# Patient Record
Sex: MALE | Race: WHITE | NOT HISPANIC OR LATINO | Employment: FULL TIME | URBAN - METROPOLITAN AREA
[De-identification: names, ages, dates, MRNs, and addresses within clinical notes are randomized per-mention and may not be internally consistent; named-entity substitution may affect disease eponyms.]

---

## 2018-02-16 ENCOUNTER — HOSPITAL ENCOUNTER (OUTPATIENT)
Dept: GENERAL RADIOLOGY | Facility: HOSPITAL | Age: 68
Discharge: HOME | End: 2018-02-16
Attending: FAMILY MEDICINE | Admitting: FAMILY MEDICINE

## 2018-02-16 ENCOUNTER — OFFICE VISIT (OUTPATIENT)
Dept: FAMILY MEDICINE CLINIC | Facility: CLINIC | Age: 68
End: 2018-02-16

## 2018-02-16 VITALS
BODY MASS INDEX: 25.69 KG/M2 | HEART RATE: 66 BPM | DIASTOLIC BLOOD PRESSURE: 98 MMHG | TEMPERATURE: 98.1 F | HEIGHT: 67 IN | SYSTOLIC BLOOD PRESSURE: 168 MMHG | WEIGHT: 163.7 LBS | OXYGEN SATURATION: 97 %

## 2018-02-16 DIAGNOSIS — M79.672 FOOT PAIN, LEFT: Primary | ICD-10-CM

## 2018-02-16 DIAGNOSIS — Z00.00 ROUTINE ADULT HEALTH MAINTENANCE: ICD-10-CM

## 2018-02-16 DIAGNOSIS — M79.672 FOOT PAIN, LEFT: ICD-10-CM

## 2018-02-16 DIAGNOSIS — Z12.5 SCREENING FOR PROSTATE CANCER: ICD-10-CM

## 2018-02-16 PROCEDURE — 99203 OFFICE O/P NEW LOW 30 MIN: CPT | Performed by: FAMILY MEDICINE

## 2018-02-16 PROCEDURE — 73630 X-RAY EXAM OF FOOT: CPT

## 2018-02-16 NOTE — PROGRESS NOTES
Subjective   Michael Whiting is a 67 y.o. male who is here for   Chief Complaint   Patient presents with   • New patient   • Foot Pain     left x 1 year    .     History of Present Illness   Nice new pt  No medical care in 5 yr  [place]ives local  Still working  Hurt left foot 1.5 yr ago, still hurts  Quit smoking  2 yr ago  Would like labs  Worried about bp    The following portions of the patient's history were reviewed and updated as appropriate: allergies, current medications, past family history, past medical history, past social history, past surgical history and problem list.    Review of Systems   Constitutional: Negative for unexpected weight change.   HENT: Negative.    Respiratory: Positive for cough.    Gastrointestinal: Negative.    Genitourinary: Negative.        Objective   Physical Exam   Constitutional: He appears well-developed and well-nourished.   Cardiovascular: Normal rate.    Pulmonary/Chest: Effort normal.   Abdominal: Soft.   Musculoskeletal:        Left foot: There is bony tenderness. There is no deformity.        Neurological: He is alert.   Psychiatric: He has a normal mood and affect.   Vitals reviewed.      Assessment/Plan   Michael was seen today for new patient and foot pain.    Diagnoses and all orders for this visit:    Foot pain, left  -     XR Foot 3+ View Left; Future    Routine adult health maintenance  -     Lipid Panel; Future  -     CBC (No Diff); Future  -     Comprehensive Metabolic Panel; Future  -     TSH; Future  -     Urinalysis With / Microscopic If Indicated - Urine, Clean Catch; Future    Screening for prostate cancer  -     PSA Screen; Future      There are no Patient Instructions on file for this visit.    There are no discontinued medications.     Return in about 1 month (around 3/16/2018) for Medicare Wellness visit, blood pressure.    Dr. Zheng Silva  [facility] [place], [place]

## 2018-02-21 ENCOUNTER — RESULTS ENCOUNTER (OUTPATIENT)
Dept: FAMILY MEDICINE CLINIC | Facility: CLINIC | Age: 68
End: 2018-02-21

## 2018-02-21 DIAGNOSIS — Z00.00 ROUTINE ADULT HEALTH MAINTENANCE: ICD-10-CM

## 2018-02-21 DIAGNOSIS — Z12.5 SCREENING FOR PROSTATE CANCER: ICD-10-CM

## 2018-03-09 LAB
ALBUMIN SERPL-MCNC: 4.1 G/DL (ref 3.5–5.2)
ALBUMIN/GLOB SERPL: 1.7 G/DL
ALP SERPL-CCNC: 93 U/L (ref 40–129)
ALT SERPL-CCNC: 19 U/L (ref 5–41)
APPEARANCE UR: CLEAR
AST SERPL-CCNC: 17 U/L (ref 5–40)
BACTERIA #/AREA URNS HPF: ABNORMAL /HPF
BILIRUB SERPL-MCNC: 0.5 MG/DL (ref 0.2–1.2)
BILIRUB UR QL STRIP: NEGATIVE
BUN SERPL-MCNC: 16 MG/DL (ref 8–23)
BUN/CREAT SERPL: 14.8 (ref 7–25)
CALCIUM SERPL-MCNC: 9.1 MG/DL (ref 8.8–10.5)
CHLORIDE SERPL-SCNC: 103 MMOL/L (ref 98–107)
CHOLEST SERPL-MCNC: 156 MG/DL (ref 0–200)
CO2 SERPL-SCNC: 28.3 MMOL/L (ref 22–29)
COLOR UR: YELLOW
CREAT SERPL-MCNC: 1.08 MG/DL (ref 0.76–1.27)
EPI CELLS #/AREA URNS HPF: ABNORMAL /HPF
ERYTHROCYTE [DISTWIDTH] IN BLOOD BY AUTOMATED COUNT: 12.7 % (ref 11.5–14.5)
GFR SERPLBLD CREATININE-BSD FMLA CKD-EPI: 68 ML/MIN/1.73
GFR SERPLBLD CREATININE-BSD FMLA CKD-EPI: 82 ML/MIN/1.73
GLOBULIN SER CALC-MCNC: 2.4 GM/DL
GLUCOSE SERPL-MCNC: 99 MG/DL (ref 65–99)
GLUCOSE UR QL: NEGATIVE
HCT VFR BLD AUTO: 46.2 % (ref 42–52)
HDLC SERPL-MCNC: 50 MG/DL (ref 40–60)
HGB BLD-MCNC: 15.5 G/DL (ref 14–18)
HGB UR QL STRIP: ABNORMAL
KETONES UR QL STRIP: NEGATIVE
LDLC SERPL CALC-MCNC: 93 MG/DL (ref 0–100)
LEUKOCYTE ESTERASE UR QL STRIP: NEGATIVE
MCH RBC QN AUTO: 32.5 PG (ref 27–31)
MCHC RBC AUTO-ENTMCNC: 33.5 G/DL (ref 31–37)
MCV RBC AUTO: 96.9 FL (ref 80–94)
NITRITE UR QL STRIP: NEGATIVE
PH UR STRIP: 6 [PH] (ref 4.5–8)
PLATELET # BLD AUTO: 271 10*3/MM3 (ref 140–500)
POTASSIUM SERPL-SCNC: 4.3 MMOL/L (ref 3.5–5.2)
PROT SERPL-MCNC: 6.5 G/DL (ref 6–8.5)
PROT UR QL STRIP: ABNORMAL
PSA SERPL-MCNC: 42.2 NG/ML (ref 0–4)
RBC # BLD AUTO: 4.77 10*6/MM3 (ref 4.7–6.1)
RBC #/AREA URNS HPF: ABNORMAL /HPF
SODIUM SERPL-SCNC: 140 MMOL/L (ref 136–145)
SP GR UR: 1.03 (ref 1–1.03)
TRIGL SERPL-MCNC: 66 MG/DL (ref 0–150)
TSH SERPL DL<=0.005 MIU/L-ACNC: 0.86 MIU/ML (ref 0.27–4.2)
UROBILINOGEN UR STRIP-MCNC: ABNORMAL MG/DL
VLDLC SERPL CALC-MCNC: 13.2 MG/DL (ref 8–32)
WBC # BLD AUTO: 6.97 10*3/MM3 (ref 4.8–10.8)
WBC #/AREA URNS HPF: ABNORMAL /HPF

## 2018-03-16 ENCOUNTER — OFFICE VISIT (OUTPATIENT)
Dept: FAMILY MEDICINE CLINIC | Facility: CLINIC | Age: 68
End: 2018-03-16

## 2018-03-16 VITALS
OXYGEN SATURATION: 97 % | HEIGHT: 67 IN | DIASTOLIC BLOOD PRESSURE: 98 MMHG | WEIGHT: 165 LBS | SYSTOLIC BLOOD PRESSURE: 160 MMHG | BODY MASS INDEX: 25.9 KG/M2 | HEART RATE: 70 BPM | TEMPERATURE: 97.9 F

## 2018-03-16 DIAGNOSIS — R97.20 ELEVATED PSA, GREATER THAN OR EQUAL TO 20 NG/ML: Primary | ICD-10-CM

## 2018-03-16 PROCEDURE — 99213 OFFICE O/P EST LOW 20 MIN: CPT | Performed by: FAMILY MEDICINE

## 2018-03-16 NOTE — PROGRESS NOTES
Subjective   Michael Whiting is a 67 y.o. male who is here for   Chief Complaint   Patient presents with   • Follow-up   • Elevated PSA   .     History of Present Illness   Benign Prostatic Hypertrophy: Patient complains of lower urinary tract symptoms. Patient reports moderate symptoms of BPH. Onset of symptoms was several years ago and was gradual in onset. His AUA Symptom Score is, x/0 manifested as obstructive symptoms including incomplete emptying, intermittency, weak stream, straining, postvoid dribbling. He has no personal history and no family history of prostate cancer.  He reports straining.  He denies nocturia two times a night.  He reports a  history of no complicating symptoms.  He denies flank pain, gross hematuria, kidney stones and recurrent UTI.    Lab Results   Component Value Date    PSA 42.200 (H) 03/09/2018           The following portions of the patient's history were reviewed and updated as appropriate: allergies, current medications, past family history, past medical history, past social history, past surgical history and problem list.    Review of Systems    Objective   Physical Exam   Genitourinary: Prostate is enlarged. Prostate is not tender.   Genitourinary Comments: Right prostate is enlarged  Stiff  And a nodule is palpated on right  Worrisome for cancer   Nursing note and vitals reviewed.      Assessment/Plan   Michael was seen today for follow-up and elevated psa.    Diagnoses and all orders for this visit:    Elevated PSA, greater than or equal to 20 ng/ml  -     Ambulatory Referral to Urology      There are no Patient Instructions on file for this visit.    There are no discontinued medications.     Return if symptoms worsen or fail to improve.    Dr. Zheng Silva  [place]

## 2018-05-31 ENCOUNTER — TRANSCRIBE ORDERS (OUTPATIENT)
Dept: ADMINISTRATIVE | Facility: HOSPITAL | Age: 68
End: 2018-05-31

## 2018-05-31 DIAGNOSIS — R97.20 ELEVATED PSA: Primary | ICD-10-CM

## 2018-06-07 ENCOUNTER — HOSPITAL ENCOUNTER (OUTPATIENT)
Dept: NUCLEAR MEDICINE | Facility: HOSPITAL | Age: 68
Discharge: HOME | End: 2018-06-07
Attending: UROLOGY

## 2018-06-07 ENCOUNTER — HOSPITAL ENCOUNTER (OUTPATIENT)
Dept: CT IMAGING | Facility: HOSPITAL | Age: 68
Discharge: HOME | End: 2018-06-07
Attending: UROLOGY | Admitting: UROLOGY

## 2018-06-07 DIAGNOSIS — R97.20 ELEVATED PSA: ICD-10-CM

## 2018-06-07 PROCEDURE — 0 TECHNETIUM MEDRONATE KIT: Performed by: UROLOGY

## 2018-06-07 PROCEDURE — 74176 CT ABD & PELVIS W/O CONTRAST: CPT

## 2018-06-07 PROCEDURE — A9503 TC99M MEDRONATE: HCPCS | Performed by: UROLOGY

## 2018-06-07 PROCEDURE — 78306 BONE IMAGING WHOLE BODY: CPT

## 2018-06-07 RX ORDER — TC 99M MEDRONATE 20 MG/10ML
20 INJECTION, POWDER, LYOPHILIZED, FOR SOLUTION INTRAVENOUS
Status: COMPLETED | OUTPATIENT
Start: 2018-06-07 | End: 2018-06-07

## 2018-06-07 RX ADMIN — Medication 20 MILLICURIE: at 12:20

## 2019-05-29 ENCOUNTER — OFFICE VISIT (OUTPATIENT)
Dept: FAMILY MEDICINE CLINIC | Facility: CLINIC | Age: 69
End: 2019-05-29

## 2019-05-29 VITALS
WEIGHT: 167 LBS | OXYGEN SATURATION: 97 % | TEMPERATURE: 98.1 F | HEIGHT: 68 IN | BODY MASS INDEX: 25.31 KG/M2 | DIASTOLIC BLOOD PRESSURE: 82 MMHG | HEART RATE: 70 BPM | SYSTOLIC BLOOD PRESSURE: 130 MMHG

## 2019-05-29 DIAGNOSIS — J40 BRONCHITIS: Primary | ICD-10-CM

## 2019-05-29 PROBLEM — Z12.5 SCREENING FOR PROSTATE CANCER: Status: RESOLVED | Noted: 2018-02-16 | Resolved: 2019-05-29

## 2019-05-29 PROBLEM — Z85.46 HISTORY OF PROSTATE CANCER: Status: ACTIVE | Noted: 2018-03-16

## 2019-05-29 PROBLEM — M79.672 FOOT PAIN, LEFT: Status: RESOLVED | Noted: 2018-02-16 | Resolved: 2019-05-29

## 2019-05-29 PROCEDURE — 99213 OFFICE O/P EST LOW 20 MIN: CPT | Performed by: FAMILY MEDICINE

## 2019-05-29 RX ORDER — AMOXICILLIN 500 MG/1
500 TABLET, FILM COATED ORAL 3 TIMES DAILY
Qty: 21 TABLET | Refills: 0 | Status: SHIPPED | OUTPATIENT
Start: 2019-05-29 | End: 2019-06-05

## 2019-05-29 NOTE — PROGRESS NOTES
"  Chief Complaint   Patient presents with   • Cough   • Nasal Congestion       Upper Respiratory Infection: Patient complains of symptoms of a URI, possible sinusitis. Symptoms include congestion, cough and sore throat. Onset of symptoms was 1 week ago, gradually worsening since that time. He also c/o productive cough with  yellow colored sputum for the past 4 days .  He is drinking plenty of fluids. Evaluation to date: none. Treatment to date: cough suppressants.  Ill contacts at home or school or work discussed.    Since his last visit here, has has undergone XRT for prostate cancer  Now on androgen blockade      Vitals:    05/29/19 1532   BP: 130/82   Pulse: 70   Temp: 98.1 °F (36.7 °C)   TempSrc: Oral   SpO2: 97%   Weight: 75.8 kg (167 lb)   Height: 172.7 cm (68\")     Gen: mildly ill appearing, alert  Ears: Tm's bulging without redness  Nose:  Congestion  Throat:  Red without exudate, some drainage, tonsils okay, several rotten teeth  Neck: no LAD  Lung: mild rales, good air movement, regular RR  Heart: RR without murmur  Skin: no rash.      Assessment/Plan   Michael was seen today for cough and nasal congestion.    Diagnoses and all orders for this visit:    Bronchitis  -     amoxicillin (AMOXIL) 500 MG tablet; Take 1 tablet by mouth 3 (Three) Times a Day for 7 days.             There are no Patient Instructions on file for this visit.    Tylenol or Advil as needed for pain, fever, muscle aches  Plenty of fluids  Hand washing discussed  Off work or school note given if needed.  Warm tea for throat.  Pros and cons of antibiotic use discussed    Dr. Zheng Silva MD  Family [place], [place]  [facility]  "

## 2019-05-30 ENCOUNTER — APPOINTMENT (OUTPATIENT)
Dept: GENERAL RADIOLOGY | Facility: HOSPITAL | Age: 69
End: 2019-05-30

## 2019-05-30 PROBLEM — R07.81 RIB PAIN ON LEFT SIDE: Status: ACTIVE | Noted: 2019-05-30

## 2019-05-30 PROCEDURE — 71046 X-RAY EXAM CHEST 2 VIEWS: CPT | Performed by: INTERNAL MEDICINE

## 2019-05-31 ENCOUNTER — OFFICE VISIT (OUTPATIENT)
Dept: FAMILY MEDICINE CLINIC | Facility: CLINIC | Age: 69
End: 2019-05-31

## 2019-05-31 VITALS
WEIGHT: 166.5 LBS | SYSTOLIC BLOOD PRESSURE: 164 MMHG | BODY MASS INDEX: 25.23 KG/M2 | DIASTOLIC BLOOD PRESSURE: 100 MMHG | HEIGHT: 68 IN | OXYGEN SATURATION: 97 % | HEART RATE: 75 BPM

## 2019-05-31 DIAGNOSIS — J40 BRONCHITIS: Primary | ICD-10-CM

## 2019-05-31 PROCEDURE — 99213 OFFICE O/P EST LOW 20 MIN: CPT | Performed by: FAMILY MEDICINE

## 2019-05-31 NOTE — PROGRESS NOTES
Subjective   Michael Whiting is a 68 y.o. male who is here for   Chief Complaint   Patient presents with   • COPD   .     History of Present Illness   Acute Bronchitis: Patient presents for presents evaluation of productive cough with sputum described as yellow. Symptoms began a few weeks ago and are unchanged since that time.  Past history is significant for frequent episodes of bronchitis.  Former smoker    [facility] follow up  Coughed so hard popped rib/costral jonit area  CXR reported no fractrues. (poor quality images)  PA AND LATERAL CHEST     CLINICAL HISTORY: Sternal and left chest wall pain after coughing     Bones are well-expanded and appear free of infiltrates. There is no  pneumothorax. The cardiomediastinal silhouette is unremarkable. The bony  thorax appears grossly intact. The sternum is intact and there are no  obvious rib fractures.     IMPRESSIONS: No evidence of acute disease within the chest.     This report was finalized on 5/30/2019 7:49 PM by Dr. Quentin Gaston M.D.  The following portions of the patient's history were reviewed and updated as appropriate: allergies, current medications, past family history, past medical history, past social history, past surgical history and problem list.    Review of Systems   Constitutional: Negative for fatigue.   Respiratory: Positive for cough and wheezing.        Objective   Physical Exam   Cardiovascular:       Pulmonary/Chest: He has wheezes.   Nursing note and vitals reviewed.      Assessment/Plan   Michael was seen today for copd.    Diagnoses and all orders for this visit:    Bronchitis  -     Full Pulmonary Function Test With Bronchodilator; Future    finish off amoxil and steroids and Breo    PFT's in 3 weeks  See in 4 weeks    There are no Patient Instructions on file for this visit.    There are no discontinued medications.     Return in about 1 month (around 6/30/2019).    Dr. Zheng Silva  [facility]  [place]

## 2019-06-17 ENCOUNTER — HOSPITAL ENCOUNTER (OUTPATIENT)
Dept: PULMONOLOGY | Facility: HOSPITAL | Age: 69
Discharge: HOME | End: 2019-06-17
Admitting: FAMILY MEDICINE

## 2019-06-17 DIAGNOSIS — J40 BRONCHITIS: ICD-10-CM

## 2019-06-17 LAB
BDY SITE: ABNORMAL
HGB BLDA-MCNC: 13.8 G/DL (ref 14–18)
SAO2 % BLDCOA: 93.4 % (ref 94–99)

## 2019-06-17 PROCEDURE — 82820 HEMOGLOBIN-OXYGEN AFFINITY: CPT

## 2019-06-17 PROCEDURE — 94010 BREATHING CAPACITY TEST: CPT

## 2019-06-17 PROCEDURE — 94726 PLETHYSMOGRAPHY LUNG VOLUMES: CPT

## 2019-06-17 PROCEDURE — 94729 DIFFUSING CAPACITY: CPT

## 2019-06-19 ENCOUNTER — OFFICE VISIT (OUTPATIENT)
Dept: FAMILY MEDICINE CLINIC | Facility: CLINIC | Age: 69
End: 2019-06-19

## 2019-06-19 VITALS
SYSTOLIC BLOOD PRESSURE: 142 MMHG | BODY MASS INDEX: 9.91 KG/M2 | WEIGHT: 65.4 LBS | HEART RATE: 68 BPM | DIASTOLIC BLOOD PRESSURE: 86 MMHG | TEMPERATURE: 98.2 F | HEIGHT: 68 IN | OXYGEN SATURATION: 96 %

## 2019-06-19 DIAGNOSIS — B08.5 HERPANGINA: Primary | ICD-10-CM

## 2019-06-19 PROCEDURE — 99213 OFFICE O/P EST LOW 20 MIN: CPT | Performed by: FAMILY MEDICINE

## 2019-06-19 RX ORDER — ACETAMINOPHEN AND CODEINE PHOSPHATE 120; 12 MG/5ML; MG/5ML
5 SOLUTION ORAL EVERY 6 HOURS PRN
Qty: 90 ML | Refills: 0 | Status: SHIPPED | OUTPATIENT
Start: 2019-06-19 | End: 2020-09-11

## 2019-06-19 NOTE — PROGRESS NOTES
"  Chief Complaint   Patient presents with   • Sore Throat     Hoarseness    Went to Urgent care 4  days ago  no strep       Upper Respiratory Infection: Patient complains of symptoms of a URI. Symptoms include sore throat. Onset of symptoms was 5 days ago, unchanged since that time. He also c/o none for the past , , .  He is not drinking much. Evaluation to date: seen previously and thought to have a viral URI. Treatment to date: prednisone.  Given prednisone.    Ill contacts at home or school or work discussed.      Vitals:    06/19/19 1608   BP: 142/86   Pulse: 68   Temp: 98.2 °F (36.8 °C)   SpO2: 96%   Weight: 29.7 kg (65 lb 6.4 oz)   Height: 172.7 cm (68\")     Gen: mildly ill appearing, alert  Ears: Tm's  without redness  Nose:  Congestion  Throat:  Red small vesicles on uvula without exudate, some drainage, tonsils okay  Neck: no LAD  Lung: good air movement, regular RR  Heart: RR without murmur  Skin: no rash.      Assessment/Plan   Michael was seen today for sore throat.    Diagnoses and all orders for this visit:    Herpangina    Other orders  -     acetaminophen-codeine (TYLENOL with CODEINE) 120-12 MG/5ML solution; Take 5 mL by mouth Every 6 (Six) Hours As Needed for Moderate Pain .      Reviewed his PFT's and they are all clear  No COPD no asthma, copy of results given.         There are no Patient Instructions on file for this visit.    Tylenol or Advil as needed for pain, fever, muscle aches  Plenty of fluids  Hand washing discussed  Off work or school note given if needed.  Warm tea for throat.  Pros and cons of antibiotic use discussed    Dr. Zheng Silva MD  Family [place], [place]  [facility]  "

## 2019-06-28 ENCOUNTER — OFFICE VISIT (OUTPATIENT)
Dept: FAMILY MEDICINE CLINIC | Facility: CLINIC | Age: 69
End: 2019-06-28

## 2019-06-28 VITALS
DIASTOLIC BLOOD PRESSURE: 100 MMHG | BODY MASS INDEX: 25.01 KG/M2 | SYSTOLIC BLOOD PRESSURE: 164 MMHG | OXYGEN SATURATION: 97 % | WEIGHT: 165 LBS | TEMPERATURE: 98.1 F | HEART RATE: 69 BPM | HEIGHT: 68 IN

## 2019-06-28 DIAGNOSIS — J06.9 ACUTE URI: Primary | ICD-10-CM

## 2019-06-28 PROBLEM — R07.81 RIB PAIN ON LEFT SIDE: Status: RESOLVED | Noted: 2019-05-30 | Resolved: 2019-06-28

## 2019-06-28 PROCEDURE — 99213 OFFICE O/P EST LOW 20 MIN: CPT | Performed by: FAMILY MEDICINE

## 2019-06-28 RX ORDER — AZITHROMYCIN 250 MG/1
TABLET, FILM COATED ORAL
Qty: 6 TABLET | Refills: 0 | Status: SHIPPED | OUTPATIENT
Start: 2019-06-28 | End: 2020-09-11

## 2019-06-28 RX ORDER — PREDNISONE 10 MG/1
10 TABLET ORAL 3 TIMES DAILY
Qty: 21 TABLET | Refills: 0 | Status: SHIPPED | OUTPATIENT
Start: 2019-06-28 | End: 2020-09-11

## 2020-09-11 ENCOUNTER — RESULTS ENCOUNTER (OUTPATIENT)
Dept: FAMILY MEDICINE CLINIC | Facility: CLINIC | Age: 70
End: 2020-09-11

## 2020-09-11 ENCOUNTER — OFFICE VISIT (OUTPATIENT)
Dept: FAMILY MEDICINE CLINIC | Facility: CLINIC | Age: 70
End: 2020-09-11

## 2020-09-11 VITALS
WEIGHT: 163.8 LBS | BODY MASS INDEX: 24.83 KG/M2 | HEIGHT: 68 IN | DIASTOLIC BLOOD PRESSURE: 80 MMHG | SYSTOLIC BLOOD PRESSURE: 150 MMHG | HEART RATE: 74 BPM | OXYGEN SATURATION: 99 %

## 2020-09-11 DIAGNOSIS — Z00.00 ROUTINE ADULT HEALTH MAINTENANCE: Primary | ICD-10-CM

## 2020-09-11 DIAGNOSIS — Z23 NEED FOR VACCINATION FOR PNEUMOCOCCUS: ICD-10-CM

## 2020-09-11 DIAGNOSIS — Z12.11 SCREEN FOR COLON CANCER: ICD-10-CM

## 2020-09-11 DIAGNOSIS — Z23 NEED FOR INFLUENZA VACCINATION: ICD-10-CM

## 2020-09-11 PROCEDURE — 90653 IIV ADJUVANT VACCINE IM: CPT | Performed by: FAMILY MEDICINE

## 2020-09-11 PROCEDURE — G0008 ADMIN INFLUENZA VIRUS VAC: HCPCS | Performed by: FAMILY MEDICINE

## 2020-09-11 PROCEDURE — G0439 PPPS, SUBSEQ VISIT: HCPCS | Performed by: FAMILY MEDICINE

## 2020-09-11 PROCEDURE — 90732 PPSV23 VACC 2 YRS+ SUBQ/IM: CPT | Performed by: FAMILY MEDICINE

## 2020-09-11 PROCEDURE — G0009 ADMIN PNEUMOCOCCAL VACCINE: HCPCS | Performed by: FAMILY MEDICINE

## 2020-09-11 NOTE — PROGRESS NOTES
The ABCs of the Annual Wellness Visit  Subsequent Medicare Wellness Visit    Chief Complaint   Patient presents with   • Medicare Wellness-subsequent       Subjective   History of Present Illness:  Michael Whiting is a 70 y.o. male who presents for a Subsequent Medicare Wellness Visit.    HEALTH RISK ASSESSMENT    Recent Hospitalizations:  No hospitalization(s) within the last year.    Current Medical Providers:  Patient Care Team:  Zheng Silva MD as PCP - General (Family Medicine)    Smoking Status:  Social History     Tobacco Use   Smoking Status Former Smoker   • Packs/day: 1.00   • Years: 27.00   • Pack years: 27.00   • Types: Cigarettes   Smokeless Tobacco Never Used   Tobacco Comment    smoked [age]       Alcohol Consumption:  Social History     Substance and Sexual Activity   Alcohol Use No       Depression Screen:   PHQ-2/PHQ-9 Depression Screening 9/11/2020   Little interest or pleasure in doing things 0   Feeling down, depressed, or hopeless 0   Trouble falling or staying asleep, or sleeping too much 0   Feeling tired or having little energy 3   Poor appetite or overeating 0   Feeling bad about yourself - or that you are a failure or have let yourself or your family down 0   Trouble concentrating on things, such as reading the newspaper or watching television 0   Moving or speaking so slowly that other people could have noticed. Or the opposite - being so fidgety or restless that you have been moving around a lot more than usual 0   Thoughts that you would be better off dead, or of hurting yourself in some way 0   Total Score 3       Fall Risk Screen:  STEADI Fall Risk Assessment has not been completed.    Health Habits and Functional and Cognitive Screening:  Functional & Cognitive Status 9/11/2020   Do you have difficulty preparing food and eating? No   Do you have difficulty bathing yourself, getting dressed or grooming yourself? No   Do you have difficulty using the toilet? No   Do you have  difficulty moving around from place to place? No   Do you have trouble with steps or getting out of a bed or a chair? No   Current Diet Well Balanced Diet   Dental Exam Not up to date   Eye Exam Not up to date   Exercise (times per week) 7 times per week   Current Exercise Activities Include Walking   Do you need help using the phone?  No   Are you deaf or do you have serious difficulty hearing?  Yes   Do you need help with transportation? No   Do you need help shopping? No   Do you need help preparing meals?  No   Do you need help with housework?  No   Do you need help with laundry? No   Do you need help taking your medications? No   Do you need help managing money? No   Do you ever drive or ride in a car without wearing a seat belt? No   Have you felt unusual stress, anger or loneliness in the last month? No   Who do you live with? Spouse   If you need help, do you have trouble finding someone available to you? No   Have you been bothered in the last four weeks by sexual problems? No   Do you have difficulty concentrating, remembering or making decisions? No         Does the patient have evidence of cognitive impairment? No    Asprin use counseling:Does not need ASA (and currently is not on it)    Age-appropriate Screening Schedule:  Refer to the list below for future screening recommendations based on patient's age, sex and/or medical conditions. Orders for these recommended tests are listed in the plan section. The patient has been provided with a written plan.    Health Maintenance   Topic Date Due   • TDAP/TD VACCINES (1 - Tdap) 06/15/1961   • ZOSTER VACCINE (1 of 2) 06/15/1998   • COLONOSCOPY  02/16/2018   • INFLUENZA VACCINE  08/01/2020          The following portions of the patient's history were reviewed and updated as appropriate: allergies, current medications, past family history, past medical history, past social history, past surgical history and problem list.    Outpatient Medications Prior to Visit  "  Medication Sig Dispense Refill   • Leuprolide Acetate (LUPRON DEPOT, 1-MONTH, IM) Inject 1 Units into the appropriate muscle as directed by prescriber Every 30 (Thirty) Days.     • acetaminophen-codeine (TYLENOL with CODEINE) 120-12 MG/5ML solution Take 5 mL by mouth Every 6 (Six) Hours As Needed for Moderate Pain . 90 mL 0   • azithromycin (ZITHROMAX) 250 MG tablet Take 2 tablets the first day, then 1 tablet daily for 4 days. 6 tablet 0   • Fluticasone Furoate-Vilanterol (BREO ELLIPTA) 100-25 MCG/INH inhaler Inhale 1 puff Daily. 1 each 0   • predniSONE (DELTASONE) 10 MG tablet Take 1 tablet by mouth 3 (Three) Times a Day. 21 tablet 0     No facility-administered medications prior to visit.        Patient Active Problem List   Diagnosis   • Routine adult health maintenance   • History of prostate cancer   • Medicare annual wellness visit, subsequent       Advanced Care Planning:  ACP discussion was held with the patient during this visit. Patient has an advance directive (not in EMR), copy requested.    Review of Systems    Compared to one year ago, the patient feels his physical health is better.  Compared to one year ago, the patient feels his mental health is better.    Reviewed chart for potential of high risk medication in the elderly: yes  Reviewed chart for potential of harmful drug interactions in the elderly:yes    Objective         Vitals:    09/11/20 1357   BP: 150/80   BP Location: Left arm   Patient Position: Sitting   Cuff Size: Adult   Pulse: 74   SpO2: 99%   Weight: 74.3 kg (163 lb 12.8 oz)   Height: 172.7 cm (68\")       Body mass index is 24.91 kg/m².  Discussed the patient's BMI with him. The BMI is in the acceptable range.    Physical Exam   Constitutional: He appears well-developed and well-nourished.   Cardiovascular: Normal rate.   Pulmonary/Chest: Effort normal.   Neurological: He is alert.   Nursing note and vitals reviewed.            Assessment/Plan   Medicare Risks and Personalized " Health Plan  CMS Preventative Services Quick Reference  Cardiovascular risk  Colon Cancer Screening  Dementia/Memory   Depression/Dysphoria  Diabetic Lab Screening   Prostate Cancer Screening     The above risks/problems have been discussed with the patient.  Pertinent information has been shared with the patient in the After Visit Summary.  Follow up plans and orders are seen below in the Assessment/Plan Section.    Diagnoses and all orders for this visit:    1. Routine adult health maintenance (Primary)  -     Comprehensive Metabolic Panel  -     CBC (No Diff)  -     TSH    2. Need for influenza vaccination  -     Fluad Quad 65+ yrs (1191-7823)    3. Screen for colon cancer  -     Cologuard - Stool, Per Rectum; Future    4. Need for vaccination for pneumococcus  -     Pneumococcal polysaccharide vaccine 23-valent, adult, subcutaneous/IM    took his last Lupron shot in June, with Dr [facility]. Hopefully his fatigue , breast tenderness and sweats will taper off.      Follow Up:  No follow-ups on file.     An After Visit Summary and PPPS were given to the patient.

## 2020-09-16 ENCOUNTER — LAB (OUTPATIENT)
Dept: FAMILY MEDICINE CLINIC | Facility: CLINIC | Age: 70
End: 2020-09-16

## 2020-09-17 ENCOUNTER — TELEPHONE (OUTPATIENT)
Dept: FAMILY MEDICINE CLINIC | Facility: CLINIC | Age: 70
End: 2020-09-17

## 2020-09-17 LAB
ALBUMIN SERPL-MCNC: 4.4 G/DL (ref 3.5–5.2)
ALBUMIN/GLOB SERPL: 2.1 G/DL
ALP SERPL-CCNC: 134 U/L (ref 39–117)
ALT SERPL-CCNC: 26 U/L (ref 1–41)
AST SERPL-CCNC: 17 U/L (ref 1–40)
BILIRUB SERPL-MCNC: 0.3 MG/DL (ref 0–1.2)
BUN SERPL-MCNC: 17 MG/DL (ref 8–23)
BUN/CREAT SERPL: 15.6 (ref 7–25)
CALCIUM SERPL-MCNC: 9.4 MG/DL (ref 8.6–10.5)
CHLORIDE SERPL-SCNC: 104 MMOL/L (ref 98–107)
CO2 SERPL-SCNC: 25 MMOL/L (ref 22–29)
CREAT SERPL-MCNC: 1.09 MG/DL (ref 0.76–1.27)
ERYTHROCYTE [DISTWIDTH] IN BLOOD BY AUTOMATED COUNT: 12.4 % (ref 12.3–15.4)
GLOBULIN SER CALC-MCNC: 2.1 GM/DL
GLUCOSE SERPL-MCNC: 94 MG/DL (ref 65–99)
HCT VFR BLD AUTO: 41.6 % (ref 37.5–51)
HGB BLD-MCNC: 13.9 G/DL (ref 13–17.7)
MCH RBC QN AUTO: 32.4 PG (ref 26.6–33)
MCHC RBC AUTO-ENTMCNC: 33.4 G/DL (ref 31.5–35.7)
MCV RBC AUTO: 97 FL (ref 79–97)
PLATELET # BLD AUTO: 271 10*3/MM3 (ref 140–450)
POTASSIUM SERPL-SCNC: 4.6 MMOL/L (ref 3.5–5.2)
PROT SERPL-MCNC: 6.5 G/DL (ref 6–8.5)
RBC # BLD AUTO: 4.29 10*6/MM3 (ref 4.14–5.8)
SODIUM SERPL-SCNC: 140 MMOL/L (ref 136–145)
TSH SERPL DL<=0.005 MIU/L-ACNC: 0.81 UIU/ML (ref 0.27–4.2)
WBC # BLD AUTO: 7.02 10*3/MM3 (ref 3.4–10.8)

## 2020-10-12 ENCOUNTER — TELEPHONE (OUTPATIENT)
Dept: FAMILY MEDICINE CLINIC | Facility: CLINIC | Age: 70
End: 2020-10-12

## 2020-10-12 DIAGNOSIS — R19.5 POSITIVE COLORECTAL CANCER SCREENING USING COLOGUARD TEST: Primary | ICD-10-CM

## 2020-10-12 NOTE — TELEPHONE ENCOUNTER
Please inform patient his ColoGuard was + for colon cancer screening  Will need a full C scope soon  Referral made

## 2020-10-13 NOTE — TELEPHONE ENCOUNTER
Spoke with pt. He stated they have already called and he has an appointment on the 21st with Dr. Prabhakar.

## 2020-10-21 ENCOUNTER — OFFICE VISIT (OUTPATIENT)
Dept: GASTROENTEROLOGY | Facility: CLINIC | Age: 70
End: 2020-10-21

## 2020-10-21 ENCOUNTER — TRANSCRIBE ORDERS (OUTPATIENT)
Dept: ADMINISTRATIVE | Facility: HOSPITAL | Age: 70
End: 2020-10-21

## 2020-10-21 VITALS — BODY MASS INDEX: 26.18 KG/M2 | TEMPERATURE: 98.5 F | HEIGHT: 68 IN | WEIGHT: 172.7 LBS

## 2020-10-21 DIAGNOSIS — Z12.11 ENCOUNTER FOR SCREENING FOR MALIGNANT NEOPLASM OF COLON: Primary | ICD-10-CM

## 2020-10-21 DIAGNOSIS — R19.5 POSITIVE COLORECTAL CANCER SCREENING USING COLOGUARD TEST: ICD-10-CM

## 2020-10-21 DIAGNOSIS — U07.1 COVID-19: Primary | ICD-10-CM

## 2020-10-21 PROCEDURE — 99203 OFFICE O/P NEW LOW 30 MIN: CPT | Performed by: NURSE PRACTITIONER

## 2020-10-21 NOTE — PATIENT INSTRUCTIONS
Will get scheduled for a colonoscopy.    Don't eat late, don't eat fried and don't eat too much at one time. Avoid tomato based products like pizza, lasagna, spagetti.  If you want to have these take an over the counter Pepcid immediately before you eat them.  Do not eat within 3-4 hrs of bedtime. Limit caffeine and carbonated beverages, if you must have them do not have later in the day.  If reflux is severe elevate the head of the bed. You may also use TUMS, maalox, or mylanta for breakthrough heartburn.    Take a daily probiotic for your gut as well.  AVOID taking NSAIDS (like ibuprofen, Aleve, Motrin, naproxen, meloxicam, etc) as much as possible and use acetaminophen (Tylenol) instead.    Drink lots of water, eat a high fiber diet with 25-30gm a day(check the fiber content in the foods you eat.  Protein bars with 15gm of fiber in each bar are a great supplement daily), and get regular exercise.     High Fiber Food suggestions:    Farzad Protein bars from [employer] = 15gm of fiber in each bar (also gluten free)  Corazon Protein bars from grocery stores= 15gm of fiber each (also gluten free)  Fiber One bars from grocery stores = 5-6gm of fiber each  Tarah Bran Buds cereal 1/2 cup = 17gm of fiber

## 2020-10-21 NOTE — PROGRESS NOTES
PATIENT INFORMATION  Michael Whiting       - 1950    CHIEF COMPLAINT  Chief Complaint   Patient presents with   • Abnormal Lab     positive cologuard       HISTORY OF PRESENT ILLNESS  Here today for pos cologuard.  Sometimes constipation maybe once a month but usually every day bm.  Hx prostate cancer with radiation and had some rectal bleeding.  Is on hormone shots q6mo for 3 years and just finished.  Last psa 0.06 in .      Denies dark tarry stool or blood in poop.  Ocasionally when strains has bright red blood with bm. No family hx of colon cancer or polyps.       REVIEWED PERTINENT RESULTS/ LABS  No results found for: CASEREPORT, FINALDX  Lab Results   Component Value Date    HGB 13.9 2020    MCV 97.0 2020     2020    ALT 26 2020    AST 17 2020    TRIG 66 2018      No results found.    REVIEW OF SYSTEMS  Review of Systems   All other systems reviewed and are negative.        ACTIVE PROBLEMS  Patient Active Problem List    Diagnosis   • Medicare annual wellness visit, subsequent [Z00.00]   • History of prostate cancer [Z85.46]   • Routine adult health maintenance [Z00.00]         PAST MEDICAL HISTORY  History reviewed. No pertinent past medical history.      SURGICAL HISTORY  Past Surgical History:   Procedure Laterality Date   • APPENDECTOMY           FAMILY HISTORY  Family History   Problem Relation Age of Onset   • Ovarian cancer Mother    • Alcohol abuse Father    • Suicide Attempts Father    • Wilm's tumor Daughter    • Dementia Sister    • Colon cancer Neg Hx    • Colon polyps Neg Hx          SOCIAL HISTORY  Social History     Occupational History   • Occupation:      Comment: retired   Tobacco Use   • Smoking status: Former Smoker     Packs/day: 1.00     Years: 27.00     Pack years: 27.00     Types: Cigarettes   • Smokeless tobacco: Never Used   • Tobacco comment: smoked [age]   Substance and Sexual Activity   • Alcohol use: No   • Drug use:  "No   • Sexual activity: Yes     Partners: Female     Birth control/protection: Post-menopausal         CURRENT MEDICATIONS    Current Outpatient Medications:   •  Leuprolide Acetate (LUPRON DEPOT, 1-MONTH, IM), Inject 1 Units into the appropriate muscle as directed by prescriber Every 30 (Thirty) Days., Disp: , Rfl:     ALLERGIES  Patient has no known allergies.    VITALS  Vitals:    10/21/20 1352   Temp: 98.5 °F (36.9 °C)   TempSrc: Temporal   Weight: 78.3 kg (172 lb 11.2 oz)   Height: 172.7 cm (67.99\")       PHYSICAL EXAM  Debilities/Disabilities Identified: None  Emotional Behavior: Appropriate  Wt Readings from Last 3 Encounters:   10/21/20 78.3 kg (172 lb 11.2 oz)   09/11/20 74.3 kg (163 lb 12.8 oz)   06/28/19 74.8 kg (165 lb)     Ht Readings from Last 1 Encounters:   10/21/20 172.7 cm (67.99\")     Body mass index is 26.27 kg/m².  Physical Exam  Vitals signs and nursing note reviewed.   Constitutional:       Appearance: He is well-developed.   HENT:      Head: Normocephalic and atraumatic.   Eyes:      Conjunctiva/sclera: Conjunctivae normal.      Pupils: Pupils are equal, round, and reactive to light.   Neck:      Musculoskeletal: Normal range of motion and neck supple.   Cardiovascular:      Rate and Rhythm: Normal rate and regular rhythm.   Pulmonary:      Effort: Pulmonary effort is normal.      Breath sounds: Normal breath sounds.   Abdominal:      General: Bowel sounds are normal. There is no distension.      Palpations: Abdomen is soft.      Tenderness: There is no abdominal tenderness.   Musculoskeletal: Normal range of motion.   Lymphadenopathy:      Cervical: No cervical adenopathy.   Skin:     General: Skin is warm and dry.   Neurological:      Mental Status: He is alert and oriented to person, place, and time.   Psychiatric:         Behavior: Behavior normal.         CLINICAL DATA REVIEWED   reviewed previous lab results and integrated with today's visit, reviewed notes from other physicians and/or " last GI encounter, reviewed previous endoscopy results and available photos, reviewed surgical pathology results from previous biopsies    ASSESSMENT  Diagnoses and all orders for this visit:    Encounter for screening for malignant neoplasm of colon  -     Case Request; Standing  -     Follow Anesthesia Guidelines / Protocol; Future  -     Obtain informed consent; Standing  -     Verify bowel prep was successful; Standing  -     Give tap water enema if bowel prep was insufficient; Standing  -     Case Request    Positive colorectal cancer screening using Cologuard test  -     Case Request; Standing  -     Follow Anesthesia Guidelines / Protocol; Future  -     Obtain informed consent; Standing  -     Verify bowel prep was successful; Standing  -     Give tap water enema if bowel prep was insufficient; Standing  -     Case Request          PLAN  Return if symptoms worsen or fail to improve.     Schedule for colonoscopy.    Don't eat late, don't eat fried and don't eat too much at one time. Avoid tomato based products like pizza, lasagna, spagetti.  If you want to have these take an over the counter Pepcid immediately before you eat them.  Do not eat within 3-4 hrs of bedtime. Limit caffeine and carbonated beverages, if you must have them do not have later in the day.  If reflux is severe elevate the head of the bed. You may also use TUMS, maalox, or mylanta for breakthrough heartburn.    Take a daily probiotic for your gut as well.  AVOID taking NSAIDS (like ibuprofen, Aleve, Motrin, naproxen, meloxicam, etc) as much as possible and use acetaminophen (Tylenol) instead.    Drink lots of water, eat a high fiber diet with 25-30gm a day(check the fiber content in the foods you eat.  Protein bars with 15gm of fiber in each bar are a great supplement daily), and get regular exercise.     High Fiber Food suggestions:    Farzad Protein bars from [employer] = 15gm of fiber in each bar (also gluten free)  Quest Protein bars from  grocery stores= 15gm of fiber each (also gluten free)  Fiber One bars from grocery stores = 5-6gm of fiber each  [employer] Bran Buds cereal 1/2 cup = 17gm of fiber      I have discussed the above plan with the patient.  They verbalize understanding and are in agreement with the plan.  They have been advised to contact the office for any questions, concerns, or changes related to their health.    30 min spent with patient today >50% spent counseling about natural history and expected course of assessed complaint and reviewed treatment options that have been tried and not tried and those currently available. Questions answered.

## 2020-10-27 ENCOUNTER — LAB (OUTPATIENT)
Dept: LAB | Facility: HOSPITAL | Age: 70
End: 2020-10-27

## 2020-10-27 DIAGNOSIS — U07.1 COVID-19: ICD-10-CM

## 2020-10-27 PROCEDURE — C9803 HOPD COVID-19 SPEC COLLECT: HCPCS

## 2020-10-27 PROCEDURE — U0004 COV-19 TEST NON-CDC HGH THRU: HCPCS | Performed by: OBSTETRICS & GYNECOLOGY

## 2020-10-28 ENCOUNTER — ANESTHESIA EVENT (OUTPATIENT)
Dept: PERIOP | Facility: HOSPITAL | Age: 70
End: 2020-10-28

## 2020-10-28 LAB — SARS-COV-2 RNA RESP QL NAA+PROBE: NOT DETECTED

## 2020-10-29 ENCOUNTER — HOSPITAL ENCOUNTER (OUTPATIENT)
Facility: HOSPITAL | Age: 70
Setting detail: HOSPITAL OUTPATIENT SURGERY
Discharge: HOME | End: 2020-10-29
Attending: INTERNAL MEDICINE | Admitting: INTERNAL MEDICINE

## 2020-10-29 ENCOUNTER — ANESTHESIA (OUTPATIENT)
Dept: PERIOP | Facility: HOSPITAL | Age: 70
End: 2020-10-29

## 2020-10-29 VITALS
WEIGHT: 171.2 LBS | RESPIRATION RATE: 15 BRPM | OXYGEN SATURATION: 97 % | SYSTOLIC BLOOD PRESSURE: 168 MMHG | BODY MASS INDEX: 26.04 KG/M2 | DIASTOLIC BLOOD PRESSURE: 91 MMHG | HEART RATE: 55 BPM | TEMPERATURE: 97.7 F

## 2020-10-29 DIAGNOSIS — Z12.11 ENCOUNTER FOR SCREENING FOR MALIGNANT NEOPLASM OF COLON: ICD-10-CM

## 2020-10-29 DIAGNOSIS — R19.5 POSITIVE COLORECTAL CANCER SCREENING USING COLOGUARD TEST: ICD-10-CM

## 2020-10-29 PROCEDURE — 25010000002 PROPOFOL 10 MG/ML EMULSION: Performed by: NURSE ANESTHETIST, CERTIFIED REGISTERED

## 2020-10-29 PROCEDURE — 45385 COLONOSCOPY W/LESION REMOVAL: CPT | Performed by: INTERNAL MEDICINE

## 2020-10-29 PROCEDURE — 88305 TISSUE EXAM BY PATHOLOGIST: CPT | Performed by: INTERNAL MEDICINE

## 2020-10-29 RX ORDER — SODIUM CHLORIDE 0.9 % (FLUSH) 0.9 %
10 SYRINGE (ML) INJECTION AS NEEDED
Status: DISCONTINUED | OUTPATIENT
Start: 2020-10-29 | End: 2020-10-29 | Stop reason: HOSPADM

## 2020-10-29 RX ORDER — LIDOCAINE HYDROCHLORIDE 20 MG/ML
INJECTION, SOLUTION INFILTRATION; PERINEURAL AS NEEDED
Status: DISCONTINUED | OUTPATIENT
Start: 2020-10-29 | End: 2020-10-29 | Stop reason: SURG

## 2020-10-29 RX ORDER — GLYCOPYRROLATE 0.2 MG/ML
INJECTION INTRAMUSCULAR; INTRAVENOUS AS NEEDED
Status: DISCONTINUED | OUTPATIENT
Start: 2020-10-29 | End: 2020-10-29 | Stop reason: SURG

## 2020-10-29 RX ORDER — LIDOCAINE HYDROCHLORIDE 10 MG/ML
0.5 INJECTION, SOLUTION EPIDURAL; INFILTRATION; INTRACAUDAL; PERINEURAL ONCE AS NEEDED
Status: DISCONTINUED | OUTPATIENT
Start: 2020-10-29 | End: 2020-10-29 | Stop reason: HOSPADM

## 2020-10-29 RX ORDER — SODIUM CHLORIDE 0.9 % (FLUSH) 0.9 %
10 SYRINGE (ML) INJECTION EVERY 12 HOURS SCHEDULED
Status: DISCONTINUED | OUTPATIENT
Start: 2020-10-29 | End: 2020-10-29 | Stop reason: HOSPADM

## 2020-10-29 RX ORDER — SODIUM CHLORIDE 9 MG/ML
40 INJECTION, SOLUTION INTRAVENOUS AS NEEDED
Status: DISCONTINUED | OUTPATIENT
Start: 2020-10-29 | End: 2020-10-29 | Stop reason: HOSPADM

## 2020-10-29 RX ORDER — SODIUM CHLORIDE, SODIUM LACTATE, POTASSIUM CHLORIDE, CALCIUM CHLORIDE 600; 310; 30; 20 MG/100ML; MG/100ML; MG/100ML; MG/100ML
9 INJECTION, SOLUTION INTRAVENOUS CONTINUOUS
Status: DISCONTINUED | OUTPATIENT
Start: 2020-10-29 | End: 2020-10-29 | Stop reason: HOSPADM

## 2020-10-29 RX ORDER — PROPOFOL 10 MG/ML
VIAL (ML) INTRAVENOUS AS NEEDED
Status: DISCONTINUED | OUTPATIENT
Start: 2020-10-29 | End: 2020-10-29 | Stop reason: SURG

## 2020-10-29 RX ADMIN — PROPOFOL 50 MG: 10 INJECTION, EMULSION INTRAVENOUS at 15:59

## 2020-10-29 RX ADMIN — LIDOCAINE HYDROCHLORIDE 100 MG: 20 INJECTION, SOLUTION INFILTRATION; PERINEURAL at 15:47

## 2020-10-29 RX ADMIN — PROPOFOL 50 MG: 10 INJECTION, EMULSION INTRAVENOUS at 16:04

## 2020-10-29 RX ADMIN — PROPOFOL 50 MG: 10 INJECTION, EMULSION INTRAVENOUS at 15:47

## 2020-10-29 RX ADMIN — PROPOFOL 50 MG: 10 INJECTION, EMULSION INTRAVENOUS at 16:10

## 2020-10-29 RX ADMIN — SODIUM CHLORIDE, POTASSIUM CHLORIDE, SODIUM LACTATE AND CALCIUM CHLORIDE 9 ML/HR: 600; 310; 30; 20 INJECTION, SOLUTION INTRAVENOUS at 13:32

## 2020-10-29 RX ADMIN — GLYCOPYRROLATE 0.2 MG: 0.2 INJECTION INTRAMUSCULAR; INTRAVENOUS at 15:47

## 2020-10-29 RX ADMIN — PROPOFOL 50 MG: 10 INJECTION, EMULSION INTRAVENOUS at 15:54

## 2020-10-29 RX ADMIN — PROPOFOL 50 MG: 10 INJECTION, EMULSION INTRAVENOUS at 15:50

## 2020-10-29 NOTE — ANESTHESIA PREPROCEDURE EVALUATION
Anesthesia Evaluation     Patient summary reviewed and Nursing notes reviewed   no history of anesthetic complications:  NPO Solid Status: > 8 hours  NPO Liquid Status: > 8 hours           Airway   Mallampati: II  TM distance: >3 FB  Neck ROM: full  No difficulty expected  Dental          Pulmonary - negative pulmonary ROS and normal exam    breath sounds clear to auscultation  Cardiovascular - negative cardio ROS and normal exam  Exercise tolerance: good (4-7 METS)    Rhythm: regular  Rate: normal        Neuro/Psych- negative ROS  GI/Hepatic/Renal/Endo - negative ROS     Musculoskeletal (-) negative ROS    Abdominal  - normal exam   Substance History      OB/GYN          Other      history of cancer (proatate) remission                    Anesthesia Plan    ASA 2     MAC       Anesthetic plan, all risks, benefits, and alternatives have been provided, discussed and informed consent has been obtained with: patient.  Use of blood products discussed with patient  Consented to blood products.

## 2020-10-29 NOTE — ANESTHESIA POSTPROCEDURE EVALUATION
Patient: Michael Whiting    Procedure Summary     Date: 10/29/20 Room / Location: [facility] ENDOSCOPY 1 /  [facility]    Anesthesia Start: 1543 Anesthesia Stop: 1615    Procedure: COLONOSCOPY, polypectomy, APC of rectal telangiectasias (N/A ) Diagnosis:       Encounter for screening for malignant neoplasm of colon      Positive colorectal cancer screening using Cologuard test      Colon polyp      Radiation proctitis      (Encounter for screening for malignant neoplasm of colon [Z12.11])      (Positive colorectal cancer screening using Cologuard test [R19.5])    Surgeon: Germain Prabhakar MD Provider: Ruy Parra CRNA    Anesthesia Type: MAC ASA Status: 2          Anesthesia Type: MAC    Vitals  Vitals Value Taken Time   /91 10/29/20 1650   Temp     Pulse 55 10/29/20 1650   Resp 15 10/29/20 1650   SpO2 97 % 10/29/20 1650           Post Anesthesia Care and Evaluation    Patient location during evaluation: bedside  Patient participation: complete - patient participated  Level of consciousness: awake and alert  Pain score: 0  Pain management: adequate  Airway patency: patent  Anesthetic complications: No anesthetic complications  PONV Status: none  Cardiovascular status: acceptable  Respiratory status: acceptable  Hydration status: acceptable  No anesthesia care post op

## 2020-11-04 LAB
LAB AP CASE REPORT: NORMAL
PATH REPORT.FINAL DX SPEC: NORMAL
PATH REPORT.GROSS SPEC: NORMAL

## 2021-06-09 ENCOUNTER — OFFICE VISIT (OUTPATIENT)
Dept: FAMILY MEDICINE CLINIC | Facility: CLINIC | Age: 71
End: 2021-06-09

## 2021-06-09 VITALS
SYSTOLIC BLOOD PRESSURE: 156 MMHG | HEART RATE: 86 BPM | DIASTOLIC BLOOD PRESSURE: 84 MMHG | OXYGEN SATURATION: 99 % | TEMPERATURE: 100.2 F | BODY MASS INDEX: 25.76 KG/M2 | HEIGHT: 68 IN | WEIGHT: 170 LBS

## 2021-06-09 DIAGNOSIS — Z85.46 HISTORY OF PROSTATE CANCER: ICD-10-CM

## 2021-06-09 DIAGNOSIS — K02.9 ACTIVE DENTAL CARIES: ICD-10-CM

## 2021-06-09 DIAGNOSIS — R30.0 DYSURIA: Primary | ICD-10-CM

## 2021-06-09 DIAGNOSIS — R19.5 LOOSE STOOLS: ICD-10-CM

## 2021-06-09 LAB
BILIRUB BLD-MCNC: NEGATIVE MG/DL
CLARITY, POC: CLEAR
COLOR UR: ABNORMAL
GLUCOSE UR STRIP-MCNC: NEGATIVE MG/DL
KETONES UR QL: NEGATIVE
LEUKOCYTE EST, POC: NEGATIVE
NITRITE UR-MCNC: NEGATIVE MG/ML
PH UR: 5 [PH] (ref 5–8)
PROT UR STRIP-MCNC: ABNORMAL MG/DL
RBC # UR STRIP: ABNORMAL /UL
SP GR UR: 1.01 (ref 1–1.03)
UROBILINOGEN UR QL: NORMAL

## 2021-06-09 PROCEDURE — 99213 OFFICE O/P EST LOW 20 MIN: CPT | Performed by: FAMILY MEDICINE

## 2021-06-09 PROCEDURE — 81002 URINALYSIS NONAUTO W/O SCOPE: CPT | Performed by: FAMILY MEDICINE

## 2021-06-09 RX ORDER — LEVOFLOXACIN 500 MG/1
500 TABLET, FILM COATED ORAL DAILY
Qty: 10 TABLET | Refills: 0 | Status: SHIPPED | OUTPATIENT
Start: 2021-06-09 | End: 2021-06-19

## 2021-06-09 RX ORDER — PHENOL 1.4 %
600 AEROSOL, SPRAY (ML) MUCOUS MEMBRANE DAILY
COMMUNITY

## 2021-06-09 RX ORDER — TAMSULOSIN HYDROCHLORIDE 0.4 MG/1
1 CAPSULE ORAL DAILY
COMMUNITY
Start: 2021-04-24 | End: 2022-02-21

## 2021-06-09 NOTE — PROGRESS NOTES
"Chief Complaint   Patient presents with   • Urinary Tract Infection     burning when urinating   • Headache   • Fever     103.8 Monday goes down with Tylenol   • Dizziness       Urinary Tract Infection: Patient complains of diarrhea, dysuria and frequency He has had symptoms for several days. Patient also complains of diarrhea x 1 , two days ago.. Patient denies sorethroat. Patient does not have a history of recurrent UTI.  Patient does not have a history of pyelonephritis.   Burns in penis during urination  Has current prostate cancer, on Lupron now  Also has > 5 rotten black teeth, (could account for the fever)      Vitals:    06/09/21 1317   BP: 156/84   Pulse: 86   Temp: 100.2 °F (37.9 °C)   TempSrc: Temporal   SpO2: 99%   Weight: 77.1 kg (170 lb)   Height: 172.7 cm (67.99\")     Gen: mildly ill appearing, alert  Mouth: 7 or so black broken teeth down to the root.  Heart: RR without murmur  Skin: no rash.  Abd: non tender  Flank: no CVA, no rash    In office urine dipstick results:  Brief Urine Lab Results  (Last result in the past 365 days)      Color   Clarity   Blood   Leuk Est   Nitrite   Protein   CREAT   Urine HCG        06/09/21 1357 Majo Clear Moderate Negative Negative 2000 mg/dL             Results for orders placed or performed in visit on 06/09/21   POCT urinalysis dipstick, manual    Specimen: Urine   Result Value Ref Range    Color Majo Yellow, Straw, Dark Yellow, Majo    Clarity, UA Clear Clear    Glucose, UA Negative Negative, 1000 mg/dL (3+) mg/dL    Bilirubin Negative Negative    Ketones, UA Negative Negative    Specific Gravity  1.015 1.005 - 1.030    Blood, UA Moderate (A) Negative    pH, Urine 5.0 5.0 - 8.0    Protein, POC 2000 mg/dL (A) Negative mg/dL    Urobilinogen, UA Normal Normal    Leukocytes Negative Negative    Nitrite, UA Negative Negative           Assessment/Plan   Diagnoses and all orders for this visit:    1. Dysuria (Primary)  -     POCT urinalysis dipstick, manual  -     " levoFLOXacin (Levaquin) 500 MG tablet; Take 1 tablet by mouth Daily for 10 days. Indications: Urinary Tract Infection, dental, and diarrhea  Dispense: 10 tablet; Refill: 0  -     Urine Culture - Urine, Urine, Clean Catch    2. Loose stools  -     levoFLOXacin (Levaquin) 500 MG tablet; Take 1 tablet by mouth Daily for 10 days. Indications: Urinary Tract Infection, dental, and diarrhea  Dispense: 10 tablet; Refill: 0    3. Active dental caries  -     levoFLOXacin (Levaquin) 500 MG tablet; Take 1 tablet by mouth Daily for 10 days. Indications: Urinary Tract Infection, dental, and diarrhea  Dispense: 10 tablet; Refill: 0    4. History of prostate cancer  -     Urine Culture - Urine, Urine, Clean Catch             Tylenol or Advil as needed for pain, fever  Plenty of fluids    Off work or school note given if needed.  Pros and cons of antibiotic use discussed    Dr. Zheng Silva MD  Family Practice  [place], [place]  [facility]

## 2021-06-12 ENCOUNTER — DOCUMENTATION (OUTPATIENT)
Dept: INTERNAL MEDICINE | Facility: CLINIC | Age: 71
End: 2021-06-12

## 2021-06-12 LAB
BACTERIA UR CULT: NO GROWTH
BACTERIA UR CULT: NORMAL

## 2021-06-12 NOTE — PROGRESS NOTES
Patient currently on day 4 of levofloxacin for presumed UTI though culture was negative. Initial symptoms included fever greater than 103 Fahrenheit, diarrhea, diarrhea, nausea, and weakness. Fortunately all symptoms have improved, no further febrile event, but he is having dizziness when he stands up and walks around. Instead of this may be related to levofloxacin.  Denies chest pain, shortness of breath, persistent diarrhea, poor p.o. intake, presyncope or syncope, palpitations. At this point age, risk of inadequate treatment with alternative antibiotic, I recommended continued antibiotic with caution and if dizziness is worsening stop therapy and call for alternatives.

## 2021-09-07 DIAGNOSIS — R53.83 FATIGUE, UNSPECIFIED TYPE: ICD-10-CM

## 2021-09-07 DIAGNOSIS — R93.3 ABNORMAL FINDINGS ON DIAGNOSTIC IMAGING OF OTHER PARTS OF DIGESTIVE TRACT: ICD-10-CM

## 2021-09-07 DIAGNOSIS — Z85.46 HISTORY OF PROSTATE CANCER: Primary | ICD-10-CM

## 2021-09-07 DIAGNOSIS — Z51.81 MEDICATION MONITORING ENCOUNTER: ICD-10-CM

## 2021-09-28 ENCOUNTER — OFFICE VISIT (OUTPATIENT)
Dept: FAMILY MEDICINE CLINIC | Facility: CLINIC | Age: 71
End: 2021-09-28

## 2021-09-28 VITALS
HEIGHT: 68 IN | WEIGHT: 168 LBS | HEART RATE: 71 BPM | BODY MASS INDEX: 25.46 KG/M2 | DIASTOLIC BLOOD PRESSURE: 80 MMHG | OXYGEN SATURATION: 96 % | SYSTOLIC BLOOD PRESSURE: 124 MMHG | TEMPERATURE: 98.2 F

## 2021-09-28 DIAGNOSIS — Z00.00 MEDICARE ANNUAL WELLNESS VISIT, SUBSEQUENT: Primary | ICD-10-CM

## 2021-09-28 PROCEDURE — 1126F AMNT PAIN NOTED NONE PRSNT: CPT | Performed by: FAMILY MEDICINE

## 2021-09-28 PROCEDURE — 1170F FXNL STATUS ASSESSED: CPT | Performed by: FAMILY MEDICINE

## 2021-09-28 PROCEDURE — 1160F RVW MEDS BY RX/DR IN RCRD: CPT | Performed by: FAMILY MEDICINE

## 2021-09-28 PROCEDURE — G0439 PPPS, SUBSEQ VISIT: HCPCS | Performed by: FAMILY MEDICINE

## 2021-09-28 NOTE — PROGRESS NOTES
The ABCs of the Annual Wellness Visit  Subsequent Medicare Wellness Visit    Chief Complaint   Patient presents with   • Medicare Wellness-subsequent      Subjective    History of Present Illness:  Michael Whiting is a 71 y.o. male who presents for a Subsequent Medicare Wellness Visit.    The following portions of the patient's history were reviewed and   updated as appropriate: allergies, current medications, past family history, past medical history, past social history, past surgical history and problem list.    Compared to one year ago, the patient feels his physical   health is the same.    Compared to one year ago, the patient feels his mental   health is the same.    Recent Hospitalizations:  This patient has had a [facility] admission record on file within the last 365 days.    Current Medical Providers:  Patient Care Team:  Zheng Silva MD as PCP - General (Family Medicine)  Aki, Germain Dumont MD as Consulting Physician (Gastroenterology)  Andrzej Diez MD as Consulting Physician (Urology)    Outpatient Medications Prior to Visit   Medication Sig Dispense Refill   • calcium carbonate (OS-SO) 600 MG tablet Take 600 mg by mouth Daily.     • Probiotic Product (PROBIOTIC-10 PO) Take 1 capsule by mouth Every Morning.     • tamsulosin (FLOMAX) 0.4 MG capsule 24 hr capsule      • Leuprolide Acetate (LUPRON DEPOT, 1-MONTH, IM) Inject 1 Units into the appropriate muscle as directed by prescriber Every 6 (Six) Months.       No facility-administered medications prior to visit.       No opioid medication identified on active medication list. I have reviewed chart for other potential  high risk medication/s and harmful drug interactions in the elderly.          Aspirin is not on active medication list.  Aspirin use is not indicated based on review of current medical condition/s. Risk of harm outweighs potential benefits.  .    Patient Active Problem List   Diagnosis   • History of prostate  "cancer   • Medicare annual wellness visit, subsequent   • Encounter for screening for malignant neoplasm of colon     Advance Care Planning  Advance Directive is not on file.  ACP discussion was held with the patient during this visit. Patient has an advance directive (not in EMR), copy requested.          Objective    Vitals:    21 1425   BP: 124/80   BP Location: Left arm   Patient Position: Sitting   Cuff Size: Adult   Pulse: 71   Temp: 98.2 °F (36.8 °C)   TempSrc: Temporal   SpO2: 96%   Weight: 76.2 kg (168 lb)   Height: 172.7 cm (67.99\")     BMI Readings from Last 1 Encounters:   21 25.55 kg/m²   BMI is above normal parameters. Recommendations include: nutrition counseling    Does the patient have evidence of cognitive impairment? No    Physical Exam  Cardiovascular:      Rate and Rhythm: Normal rate.   Pulmonary:      Effort: Pulmonary effort is normal.   Neurological:      Mental Status: He is alert.   Psychiatric:         Mood and Affect: Mood normal.       Lab Results   Component Value Date     (H) 2021    CHLPL 181 2021    TRIG 96 2021    HDL 50 2021     (H) 2021    VLDL 18 2021            HEALTH RISK ASSESSMENT    Smoking Status:  Social History     Tobacco Use   Smoking Status Former Smoker   • Packs/day: 1.00   • Years: 27.00   • Pack years: 27.00   • Types: Cigarettes   • Quit date: 10/28/2015   • Years since quittin.9   Smokeless Tobacco Current User   • Types: Chew     Alcohol Consumption:  Social History     Substance and Sexual Activity   Alcohol Use Yes   • Alcohol/week: 1.0 standard drinks   • Types: 1 Cans of beer per week    Comment: socially     Fall Risk Screen:    STEADI Fall Risk Assessment was completed, and patient is at LOW risk for falls.Assessment completed on:2021    Depression Screening:  PHQ-2/PHQ-9 Depression Screening 2021   Little interest or pleasure in doing things 0   Feeling down, depressed, or " hopeless 0   Trouble falling or staying asleep, or sleeping too much -   Feeling tired or having little energy -   Poor appetite or overeating -   Feeling bad about yourself - or that you are a failure or have let yourself or your family down -   Trouble concentrating on things, such as reading the newspaper or watching television -   Moving or speaking so slowly that other people could have noticed. Or the opposite - being so fidgety or restless that you have been moving around a lot more than usual -   Thoughts that you would be better off dead, or of hurting yourself in some way -   Total Score 0       Health Habits and Functional and Cognitive Screening:  Functional & Cognitive Status 9/28/2021   Do you have difficulty preparing food and eating? No   Do you have difficulty bathing yourself, getting dressed or grooming yourself? No   Do you have difficulty using the toilet? No   Do you have difficulty moving around from place to place? No   Do you have trouble with steps or getting out of a bed or a chair? No   Current Diet Well Balanced Diet   Dental Exam Up to date   Eye Exam Not up to date   Exercise (times per week) 6 times per week   Current Exercises Include Walking   Current Exercise Activities Include -   Do you need help using the phone?  No   Are you deaf or do you have serious difficulty hearing?  Yes   Do you need help with transportation? No   Do you need help shopping? No   Do you need help preparing meals?  No   Do you need help with housework?  No   Do you need help with laundry? No   Do you need help taking your medications? No   Do you need help managing money? No   Do you ever drive or ride in a car without wearing a seat belt? No   Have you felt unusual stress, anger or loneliness in the last month? No   Who do you live with? Spouse   If you need help, do you have trouble finding someone available to you? No   Have you been bothered in the last four weeks by sexual problems? No   Do you have  difficulty concentrating, remembering or making decisions? No       Age-appropriate Screening Schedule:  Refer to the list below for future screening recommendations based on patient's age, sex and/or medical conditions. Orders for these recommended tests are listed in the plan section. The patient has been provided with a written plan.    Health Maintenance   Topic Date Due   • TDAP/TD VACCINES (1 - Tdap) Never done   • ZOSTER VACCINE (1 of 2) Never done   • INFLUENZA VACCINE  10/01/2021              Assessment/Plan   CMS Preventative Services Quick Reference  Risk Factors Identified During Encounter  Dementia/Memory   Depression/Dysphoria  The above risks/problems have been discussed with the patient.  Follow up actions/plans if indicated are seen below in the Assessment/Plan Section.  Pertinent information has been shared with the patient in the After Visit Summary.    Diagnoses and all orders for this visit:    1. Medicare annual wellness visit, subsequent (Primary)    doing well  Labs ok    Follow Up:   No follow-ups on file.     An After Visit Summary and PPPS were made available to the patient.

## 2022-02-21 ENCOUNTER — OFFICE VISIT (OUTPATIENT)
Dept: SURGERY | Facility: CLINIC | Age: 72
End: 2022-02-21

## 2022-02-21 VITALS — BODY MASS INDEX: 24.4 KG/M2 | HEIGHT: 68 IN | WEIGHT: 161 LBS

## 2022-02-21 DIAGNOSIS — N62 HYPERTROPHY OF MALE BREAST: Primary | ICD-10-CM

## 2022-02-21 DIAGNOSIS — N64.4 BREAST PAIN: ICD-10-CM

## 2022-02-21 PROCEDURE — 99203 OFFICE O/P NEW LOW 30 MIN: CPT | Performed by: SURGERY

## 2022-02-21 NOTE — PROGRESS NOTES
SUMMARY (A/P):    71-year-old gentleman with history of prostate cancer treated with radiation and hormone treatment.  Developed breast enlargement during hormone treatment and this has gone down since he stopped hormone treatment a little over a year ago, but breast size did not return to baseline.  Today he presented with some right greater than left breast pain and based on that and physical exam, I have recommended mammography to rule out any underlying lesion.  If mammography is negative, then this simply represents breast hypertrophy and no further work-up or treatment is necessary.      CC:    Bilateral breast pain    HPI:    71-year-old gentleman who underwent radiation and hormone treatment for prostate cancer.  He did develop breast enlargement while on hormone treatment.    ENDOSCOPY:   • Colonoscopy 10/29/2020: Sigmoid colon tubulovillous adenoma    SOCIAL HISTORY:   • Denies tobacco use  • Occasional alcohol use    FAMILY HISTORY:    • Colorectal cancer: Negative    PREVIOUS ABDOMINAL SURGERY    • Appendectomy 1967    PAST MEDICAL HISTORY:    • Prostate cancer two thousand [age]    MEDICATIONS:   • Calcium carbonate  • Probiotic    ALLERGIES:   • None    PHYSICAL EXAM:   • Constitutional: Well-developed well-nourished, no acute distress  • Vital signs:   o Weight 161 pounds  o Height 68 inches  o BMI 24.5  • Eyes: Conjunctiva normal, sclera nonicteric  • Neck: Supple, no palpable mass, trachea midline  • Respiratory: Normal inspiratory effort  • Cardiovascular: Regular rate  • Breast: Both breasts have significant hypertrophic breast tissue with no discrete palpable mass.  No nipple discharge.  No nipple inversion.  Right slightly greater than left.  • Lymphatics (palpable nodes):  cervical-negative, axillary-negative  • Psychiatric: Alert and oriented ×3, normal affect     NANCIE FLORENTINO M.D.

## 2022-03-08 DIAGNOSIS — N62 HYPERTROPHY OF MALE BREAST: Primary | ICD-10-CM

## 2022-04-05 ENCOUNTER — HOSPITAL ENCOUNTER (OUTPATIENT)
Dept: ULTRASOUND IMAGING | Facility: HOSPITAL | Age: 72
End: 2022-04-05

## 2022-04-05 ENCOUNTER — HOSPITAL ENCOUNTER (OUTPATIENT)
Dept: MAMMOGRAPHY | Facility: HOSPITAL | Age: 72
Discharge: HOME | End: 2022-04-05
Admitting: SURGERY

## 2022-04-05 DIAGNOSIS — N62 HYPERTROPHY OF MALE BREAST: ICD-10-CM

## 2022-04-05 PROCEDURE — 77066 DX MAMMO INCL CAD BI: CPT

## 2022-04-05 PROCEDURE — G0279 TOMOSYNTHESIS, MAMMO: HCPCS

## 2022-04-10 NOTE — PROGRESS NOTES
Please let him know that his mammogram showed benign gynecomastia, no concerning findings.  Nothing further needs to be done

## 2022-04-12 ENCOUNTER — TELEPHONE (OUTPATIENT)
Dept: SURGERY | Facility: CLINIC | Age: 72
End: 2022-04-12

## 2022-04-12 NOTE — TELEPHONE ENCOUNTER
----- Message from Gray Werner MD sent at 4/10/2022  6:37 PM EDT -----  Please let him know that his mammogram showed benign gynecomastia, no concerning findings.  Nothing further needs to be done

## 2022-06-10 ENCOUNTER — TRANSCRIBE ORDERS (OUTPATIENT)
Dept: ADMINISTRATIVE | Facility: HOSPITAL | Age: 72
End: 2022-06-10

## 2022-06-10 DIAGNOSIS — C61 PROSTATE CANCER: Primary | ICD-10-CM

## 2022-06-28 ENCOUNTER — HOSPITAL ENCOUNTER (OUTPATIENT)
Dept: NUCLEAR MEDICINE | Facility: HOSPITAL | Age: 72
Discharge: HOME | End: 2022-06-28

## 2022-06-28 ENCOUNTER — HOSPITAL ENCOUNTER (OUTPATIENT)
Dept: CT IMAGING | Facility: HOSPITAL | Age: 72
Discharge: HOME | End: 2022-06-28
Admitting: UROLOGY

## 2022-06-28 DIAGNOSIS — C61 PROSTATE CANCER: ICD-10-CM

## 2022-06-28 PROCEDURE — 0 TECHNETIUM MEDRONATE KIT: Performed by: UROLOGY

## 2022-06-28 PROCEDURE — 74176 CT ABD & PELVIS W/O CONTRAST: CPT

## 2022-06-28 PROCEDURE — 78306 BONE IMAGING WHOLE BODY: CPT

## 2022-06-28 PROCEDURE — A9503 TC99M MEDRONATE: HCPCS | Performed by: UROLOGY

## 2022-06-28 RX ORDER — TC 99M MEDRONATE 20 MG/10ML
22.5 INJECTION, POWDER, LYOPHILIZED, FOR SOLUTION INTRAVENOUS
Status: COMPLETED | OUTPATIENT
Start: 2022-06-28 | End: 2022-06-28

## 2022-06-28 RX ADMIN — Medication 22.5 MILLICURIE: at 12:46

## 2022-09-13 ENCOUNTER — TRANSCRIBE ORDERS (OUTPATIENT)
Dept: ADMINISTRATIVE | Facility: HOSPITAL | Age: 72
End: 2022-09-13

## 2022-09-13 DIAGNOSIS — C61 PROSTATIC CANCER: Primary | ICD-10-CM

## 2022-09-22 ENCOUNTER — TRANSCRIBE ORDERS (OUTPATIENT)
Dept: ADMINISTRATIVE | Facility: HOSPITAL | Age: 72
End: 2022-09-22

## 2022-09-22 DIAGNOSIS — R97.21 INCREASING PROSTATE SPECIFIC ANTIGEN (PSA) LEVEL AFTER TREATMENT FOR MALIGNANT NEOPLASM OF PROSTATE: ICD-10-CM

## 2022-09-22 DIAGNOSIS — C61 PROSTATE CANCER: Primary | ICD-10-CM

## 2022-10-05 ENCOUNTER — HOSPITAL ENCOUNTER (OUTPATIENT)
Dept: PET IMAGING | Facility: HOSPITAL | Age: 72
Discharge: HOME | End: 2022-10-05

## 2022-10-05 DIAGNOSIS — R97.21 INCREASING PROSTATE SPECIFIC ANTIGEN (PSA) LEVEL AFTER TREATMENT FOR MALIGNANT NEOPLASM OF PROSTATE: ICD-10-CM

## 2022-10-05 DIAGNOSIS — C61 PROSTATE CANCER: ICD-10-CM

## 2022-10-05 PROCEDURE — A9595 PIFLUFOLASTAT F 18 9 MCI SOLUTION PREFILLED SYRINGE: HCPCS | Performed by: UROLOGY

## 2022-10-05 PROCEDURE — 78815 PET IMAGE W/CT SKULL-THIGH: CPT

## 2022-10-05 PROCEDURE — 0 PIFLUFOLASTAT F 18 9 MCI SOLUTION PREFILLED SYRINGE: Performed by: UROLOGY

## 2022-10-05 RX ADMIN — PIFLUFOLASTAT F-18 1 DOSE: 80 INJECTION INTRAVENOUS at 11:55

## 2022-10-19 ENCOUNTER — TRANSCRIBE ORDERS (OUTPATIENT)
Dept: ADMINISTRATIVE | Facility: HOSPITAL | Age: 72
End: 2022-10-19

## 2022-10-19 DIAGNOSIS — C61 PROSTATE CANCER: Primary | ICD-10-CM

## 2022-10-26 ENCOUNTER — APPOINTMENT (OUTPATIENT)
Dept: MRI IMAGING | Facility: HOSPITAL | Age: 72
End: 2022-10-26

## 2022-11-29 ENCOUNTER — HOSPITAL ENCOUNTER (OUTPATIENT)
Dept: MRI IMAGING | Facility: HOSPITAL | Age: 72
Discharge: HOME | End: 2022-11-29
Admitting: RADIOLOGY

## 2022-11-29 DIAGNOSIS — C61 PROSTATE CANCER: ICD-10-CM

## 2022-11-29 PROCEDURE — 82565 ASSAY OF CREATININE: CPT

## 2022-11-29 PROCEDURE — 72197 MRI PELVIS W/O & W/DYE: CPT

## 2022-11-29 PROCEDURE — A9577 INJ MULTIHANCE: HCPCS | Performed by: RADIOLOGY

## 2022-11-29 PROCEDURE — 0 GADOBENATE DIMEGLUMINE 529 MG/ML SOLUTION: Performed by: RADIOLOGY

## 2022-11-29 RX ADMIN — GADOBENATE DIMEGLUMINE 15 ML: 529 INJECTION, SOLUTION INTRAVENOUS at 22:04

## 2022-11-30 LAB — CREAT BLDA-MCNC: 1 MG/DL (ref 0.6–1.3)

## 2023-01-12 ENCOUNTER — TRANSCRIBE ORDERS (OUTPATIENT)
Dept: ADMINISTRATIVE | Facility: HOSPITAL | Age: 73
End: 2023-01-12
Payer: MEDICARE

## 2023-01-12 DIAGNOSIS — C61 PROSTATE CANCER: Primary | ICD-10-CM

## 2023-01-12 DIAGNOSIS — R31.0 GROSS HEMATURIA: ICD-10-CM

## 2023-01-18 ENCOUNTER — HOSPITAL ENCOUNTER (OUTPATIENT)
Dept: CT IMAGING | Facility: HOSPITAL | Age: 73
Discharge: HOME | End: 2023-01-18
Payer: MEDICARE

## 2023-01-19 ENCOUNTER — HOSPITAL ENCOUNTER (OUTPATIENT)
Dept: CT IMAGING | Facility: HOSPITAL | Age: 73
Discharge: HOME | End: 2023-01-19
Admitting: NURSE PRACTITIONER
Payer: MEDICARE

## 2023-01-19 DIAGNOSIS — R31.0 GROSS HEMATURIA: ICD-10-CM

## 2023-01-19 DIAGNOSIS — C61 PROSTATE CANCER: ICD-10-CM

## 2023-01-19 PROCEDURE — 0 IOPAMIDOL PER 1 ML: Performed by: NURSE PRACTITIONER

## 2023-01-19 PROCEDURE — 74178 CT ABD&PLV WO CNTR FLWD CNTR: CPT

## 2023-01-19 RX ADMIN — IOPAMIDOL 100 ML: 755 INJECTION, SOLUTION INTRAVENOUS at 10:45

## 2023-06-19 ENCOUNTER — TELEPHONE (OUTPATIENT)
Dept: FAMILY MEDICINE CLINIC | Facility: CLINIC | Age: 73
End: 2023-06-19

## 2023-06-19 NOTE — TELEPHONE ENCOUNTER
Caller: Felipa Whiting    Relationship: Emergency Contact    Best call back number: [phone]     What was the call regarding: PATIENT'S WIFE IS GETTING A TDAP VACCINE AT [facility], AND WOULD LIKE GET ORDERS FOR HER  TO GET A VACCINE THERE AS WELL.    [id number] [place], [place] - [address] AT [facility] [address] - [phone]  - [phone] FX

## 2023-06-19 NOTE — TELEPHONE ENCOUNTER
LVM for patients wife informing that they can just walk into any pharmacy and let them know what vaccine they are needing. If insurance is not covering the vaccine then advised they go to the health department.

## 2024-07-26 ENCOUNTER — HOSPITAL ENCOUNTER (EMERGENCY)
Facility: HOSPITAL | Age: 74
Discharge: OTHER INSTITUTION | End: 2024-07-26
Attending: EMERGENCY MEDICINE
Payer: OTHER MISCELLANEOUS

## 2024-07-26 ENCOUNTER — APPOINTMENT (OUTPATIENT)
Dept: CT IMAGING | Facility: HOSPITAL | Age: 74
End: 2024-07-26
Payer: OTHER MISCELLANEOUS

## 2024-07-26 VITALS
RESPIRATION RATE: 18 BRPM | BODY MASS INDEX: 24.39 KG/M2 | TEMPERATURE: 98 F | OXYGEN SATURATION: 96 % | HEIGHT: 68 IN | SYSTOLIC BLOOD PRESSURE: 186 MMHG | HEART RATE: 55 BPM | DIASTOLIC BLOOD PRESSURE: 94 MMHG | WEIGHT: 160.94 LBS

## 2024-07-26 DIAGNOSIS — H21.561: ICD-10-CM

## 2024-07-26 DIAGNOSIS — S01.111A RIGHT EYELID LACERATION, INITIAL ENCOUNTER: Primary | ICD-10-CM

## 2024-07-26 DIAGNOSIS — S05.31XA: ICD-10-CM

## 2024-07-26 PROCEDURE — 70480 CT ORBIT/EAR/FOSSA W/O DYE: CPT

## 2024-07-26 PROCEDURE — 90715 TDAP VACCINE 7 YRS/> IM: CPT | Performed by: EMERGENCY MEDICINE

## 2024-07-26 PROCEDURE — 90471 IMMUNIZATION ADMIN: CPT | Performed by: EMERGENCY MEDICINE

## 2024-07-26 PROCEDURE — 99285 EMERGENCY DEPT VISIT HI MDM: CPT

## 2024-07-26 PROCEDURE — 25010000002 TETANUS-DIPHTH-ACELL PERTUSSIS 5-2.5-18.5 LF-MCG/0.5 SUSPENSION PREFILLED SYRINGE: Performed by: EMERGENCY MEDICINE

## 2024-07-26 RX ORDER — PROPARACAINE HYDROCHLORIDE 5 MG/ML
2 SOLUTION/ DROPS OPHTHALMIC ONCE
Status: COMPLETED | OUTPATIENT
Start: 2024-07-26 | End: 2024-07-26

## 2024-07-26 RX ORDER — TAMSULOSIN HYDROCHLORIDE 0.4 MG/1
1 CAPSULE ORAL DAILY
COMMUNITY

## 2024-07-26 RX ADMIN — PROPARACAINE HYDROCHLORIDE 2 DROP: 5 SOLUTION/ DROPS OPHTHALMIC at 18:41

## 2024-07-26 RX ADMIN — TETANUS TOXOID, REDUCED DIPHTHERIA TOXOID AND ACELLULAR PERTUSSIS VACCINE, ADSORBED 0.5 ML: 5; 2.5; 8; 8; 2.5 SUSPENSION INTRAMUSCULAR at 18:35

## 2024-07-26 NOTE — ED PROVIDER NOTES
EMERGENCY DEPARTMENT ENCOUNTER  Room Number:  27/27  PCP: Zheng Silva MD  Independent Historians: Patient and spouse      HPI:  Chief Complaint: Right eye injury    A complete HPI/ROS/PMH/PSH/SH/FH are unobtainable due to: None    Chronic or social conditions impacting patient care (Social Determinants of Health): None      Context: Michael Whiting is a 74 y.o. male with a medical history of state cancer who presents to the ED c/o acute injury to right that occurred around 1500 today.  Patient was working outside when he was struck accidentally with a metal bar in the right eye.  He reports fuzzy vision and a laceration associated with this.  Unknown date of last tetanus immunization.  No other complaints.      Review of prior external notes (non-ED) -and- Review of prior external test results outside of this encounter:        PAST MEDICAL HISTORY  Active Ambulatory Problems     Diagnosis Date Noted    History of prostate cancer 03/16/2018    Medicare annual wellness visit, subsequent 09/11/2020    Encounter for screening for malignant neoplasm of colon 10/21/2020     Resolved Ambulatory Problems     Diagnosis Date Noted    Routine adult health maintenance 02/16/2018    Foot pain, left 02/16/2018    Screening for prostate cancer 02/16/2018    Rib pain on left side 05/30/2019    Positive colorectal cancer screening using Cologuard test 10/21/2020     Past Medical History:   Diagnosis Date    Colon polyp     Hearing loss     Hx of radiation therapy     Prostate cancer 2017         PAST SURGICAL HISTORY  Past Surgical History:   Procedure Laterality Date    APPENDECTOMY N/A 1967    COLONOSCOPY N/A 10/29/2020    Procedure: COLONOSCOPY, polypectomy, APC of rectal telangiectasias;  Surgeon: Germain Prabhakar MD;  Location: [facility];  Service: Gastroenterology;  Laterality: N/A;  Radiation proctitis  Sigmoid polyp (hot snare)    PROSTATE BIOPSY Bilateral 2017         FAMILY HISTORY  Family History    Problem Relation Age of Onset    Ovarian cancer Mother     Alcohol abuse Father     Suicide Attempts Father     Wilm's tumor Daughter     Dementia Sister     No Known Problems Sister     No Known Problems Sister     No Known Problems Sister     No Known Problems Sister     Colon cancer Neg Hx     Colon polyps Neg Hx          SOCIAL HISTORY  Social History     Socioeconomic History    Marital status:     Number of children: 2   Tobacco Use    Smoking status: Former     Current packs/day: 0.00     Average packs/day: 1 pack/day for 27.0 years (27.0 ttl pk-yrs)     Types: Cigarettes     Start date: 10/28/1988     Quit date: 10/28/2015     Years since quittin.7    Smokeless tobacco: Current     Types: Chew   Vaping Use    Vaping status: Never Used   Substance and Sexual Activity    Alcohol use: Yes     Alcohol/week: 1.0 - 2.0 standard drink of alcohol     Types: 1 - 2 Cans of beer per week     Comment: socially    Drug use: No    Sexual activity: Defer     Partners: Female         ALLERGIES  Patient has no known allergies.      REVIEW OF SYSTEMS  Review of Systems  Included in HPI  All systems reviewed and negative except for those discussed in HPI.      PHYSICAL EXAM    I have reviewed the triage vital signs and nursing notes.    ED Triage Vitals [24 1810]   Temp Heart Rate Resp BP SpO2   98.1 °F (36.7 °C) 63 18 -- 97 %      Temp src Heart Rate Source Patient Position BP Location FiO2 (%)   -- -- -- -- --       Physical Exam    Physical Exam   Constitutional: No distress.  Nontoxic  HENT:  Head: Normocephalic and atraumatic.   Oropharynx: Mucous membranes are moist.   Eyes: . No scleral icterus. No conjunctival pallor.  Irregular nonresponsive right pupil though consensual light response is noted in the left eye with light shined in the right.  There is periorbital ecchymosis and an abrasion to the upper eyelid on the right there is also a laceration involving the margin of the right upper eyelid.  No  gross hyphema noted on right.  Neck: Normal range of motion. Neck supple.   Cardiovascular: Pink warm and well perfused throughout.    Pulmonary/Chest: No respiratory distress.  No tachypnea or increased work of breathing appreciated.    Musculoskeletal: Moves all extremities equally.    Neurological: Alert and oriented.  No acute focal deficit appreciated.  Skin: Skin is pink, warm, and dry.   Psychiatric: Mood and affect normal.   Nursing note and vitals reviewed.             LAB RESULTS  No results found for this or any previous visit (from the past 24 hour(s)).      RADIOLOGY  No Radiology Exams Resulted Within Past 24 Hours      MEDICATIONS GIVEN IN ER  Medications   fluorescein ophthalmic strip 1 strip (has no administration in time range)   proparacaine (ALCAINE) 0.5 % ophthalmic solution 2 drop (2 drops Right Eye Given 7/26/24 1841)   Tetanus-Diphth-Acell Pertussis (BOOSTRIX) injection 0.5 mL (0.5 mL Intramuscular Given 7/26/24 1835)         ORDERS PLACED DURING THIS VISIT:  Orders Placed This Encounter   Procedures    CT Orbits Without Contrast    Visual acuity screening    Wound Dressing    IP General Consult (Use specialty-specific consult if known)         OUTPATIENT MEDICATION MANAGEMENT:  Current Facility-Administered Medications Ordered in Epic   Medication Dose Route Frequency Provider Last Rate Last Admin    fluorescein ophthalmic strip 1 strip  1 strip Both Eyes Once Blaze Leiva MD         Current Outpatient Medications Ordered in Epic   Medication Sig Dispense Refill    calcium carbonate (OS-SO) 600 MG tablet Take 1 tablet by mouth Daily.      Probiotic Product (PROBIOTIC-10 PO) Take 1 capsule by mouth Every Morning.      tamsulosin (FLOMAX) 0.4 MG capsule 24 hr capsule Take 1 capsule by mouth Daily.           PROCEDURES  Procedures    Total critical care time: Approximately 40 minutes    Due to a high probability of clinically significant, life threatening deterioration, the patient required  my highest level of preparedness to intervene emergently and I personally spent this critical care time directly and personally managing the patient. This critical care time included obtaining a history; examining the patient; vital sign monitoring; ordering and review of studies; arranging urgent treatment with development of a management plan; evaluation of patient's response to treatment; frequent reassessment; and, discussions with other providers.    This critical care time was performed to assess and manage the high probability of imminent, life-threatening deterioration that could result in multi-organ failure. It was exclusive of separately billable procedures and treating other patients and teaching time.    Please see MDM section and the rest of the note for further information on patient assessment and treatment.          PROGRESS, DATA ANALYSIS, CONSULTS, AND MEDICAL DECISION MAKING  All labs have been independently interpreted by me.  All radiology studies have been reviewed by me. All EKG's have been independently viewed and interpreted by me.  Discussion below represents my analysis of pertinent findings related to patient's condition, differential diagnosis, treatment plan and final disposition.    Differential diagnosis:   My differential diagnoses for ocular issues includes but is not limited to acute eye pain, chemical conjunctivitis, allergic conjunctivitis, infectious conjunctivitis, gonococcal conjunctivitis, corneal abrasion, corneal ulcer, injury to cornea from contact lens, corneal foreign body.  Corneal alkali burn, corneal acid burn, decreased vision, acute glaucoma, herpes keratitis, hyphema, acute iritis, orbital wall fracture, penetrating eye injury, retinal detachment, temporal arteritis, UV keratitis, central retinal artery occlusion, CVA.      Clinical Scores:                  ED Course as of 07/26/24 2017 Fri Jul 26, 2024   2224 Oculoplastics consultation requested [RS]   2910  Visual acuity:  Left 20/25  Right 20/100  Bilateral 20/20 though patient complains of some double vision [RS]   1927 CONSULT        Provider: Fabián Goode and [facility] - oculoplastics    Discussion: Reviewed patient history, ED presentation and evaluation.  Given the injury, he would recommend transfer to [facility] for emergent ophthalmology evaluation.    Agreeable c treatment and planned disposition.         [RS]   1933 Patient updated with findings and recommendation for transfer and agreeable. [RS]   1933 We will put a moist dressing over the eye and contact [facility] emergency department for ED to ED transfer. [RS]   2006 CONSULT        Provider: Dr. Aj HARDING ED    Discussion: Reviewed patient history, ED presentation and evaluation.  He did recommend we go ahead and get a noncontrast orbit study right now I have the result pushed over electronically to them so that will be available when he arrives.  Otherwise he is agreeable to accept the patient in transfer ED to ED.  Patient to arrive by private vehicle.    Agreeable c treatment and planned disposition.         [RS]   2016 CONSULT        Provider: Dr. Wanda HARDING optho    Discussion: Patient history, ED presentation and evaluation as well as pending imaging.  Agreeable to see patient in consultation at the [facility] ED.    Agreeable c treatment and planned disposition.         [RS]   2016 RADIOLOGY      Study: Noncontrast CT orbits  Findings: Radiopaque foreign body of the right globe identified  I independently viewed and interpreted these images contemporaneously with treatment.    [RS]      ED Course User Index  [RS] Blaze Leiva MD         Prescription drug monitoring program review:     AS OF 20:17 EDT VITALS:    BP - (!) 188/99  HR - 63  TEMP - 98.1 °F (36.7 °C)  O2 SATS - 98%    COMPLEXITY OF CARE  Patient requires transfer to high-level care      DIAGNOSIS  Final diagnoses:   Right eyelid laceration, initial encounter    Pupil irregularity, right   Rupture of globe, right, initial encounter         DISPOSITION  ED Disposition       ED Disposition   Transfer to Another Facility     Condition   --    Comment   --                  Transfer-[facility]    Please note that portions of this document were completed with a voice recognition program.    Note Disclaimer: At [facility], we believe that sharing information builds trust and better relationships. You are receiving this note because you recently visited [facility]. It is possible you will see health information before a provider has talked with you about it. This kind of information can be easy to misunderstand. To help you fully understand what it means for your health, we urge you to discuss this note with your provider.            Blaze Leiva MD  07/26/24 2017

## 2025-01-30 NOTE — PROGRESS NOTES
"  Chief Complaint   Patient presents with   • URI     Follow Up  No Better       Upper Respiratory Infection: Patient complains of follow up on a URI. Symptoms include congestion, cough, irritability and sore throat. Onset of symptoms was 2 weeks ago, unchanged since that time. He also c/o achiness, low grade fever, non productive cough and sore throat for the past 2 weeks .  He is drinking plenty of fluids. Evaluation to date: seen previously and thought to have a viral URI. Treatment to date: none.  Ill contacts at home or school or work discussed.      Vitals:    06/28/19 1455   BP: 164/100   Pulse: 69   Temp: 98.1 °F (36.7 °C)   SpO2: 97%   Weight: 74.8 kg (165 lb)   Height: 172.7 cm (68\")     Gen: mildly ill appearing, alert  Ears: Tm's bulging without redness  Nose:  Congestion  Throat:  Red without exudate, some drainage, tonsils okay  Neck: no LAD  Lung: mild rales, good air movement, regular RR  Heart: RR without murmur  Skin: no rash.      Assessment/Plan   Michael was seen today for uri.    Diagnoses and all orders for this visit:    Acute URI  -     azithromycin (ZITHROMAX) 250 MG tablet; Take 2 tablets the first day, then 1 tablet daily for 4 days.  -     predniSONE (DELTASONE) 10 MG tablet; Take 1 tablet by mouth 3 (Three) Times a Day.             There are no Patient Instructions on file for this visit.    Tylenol or Advil as needed for pain, fever, muscle aches  Plenty of fluids  Hand washing discussed  Off work or school note given if needed.  Warm tea for throat.  Pros and cons of antibiotic use discussed    Dr. Zheng Silva MD  [place], [place]  [facility]  " Unable to Reach you letter mailed out.

## (undated) DEVICE — SNAR POLYP SENSATION STDOVL 27 240 BX40

## (undated) DEVICE — BW-412T DISP COMBO CLEANING BRUSH: Brand: SINGLE USE COMBINATION CLEANING BRUSH

## (undated) DEVICE — SUCTION CANISTER, 3000CC,SAFELINER: Brand: DEROYAL

## (undated) DEVICE — JACKT LAB F/R KNIT CUFF/COLR XLG BLU

## (undated) DEVICE — KT ORCA ORCAPOD DISP STRL

## (undated) DEVICE — GLV SURG SENSICARE PI MIC PF SZ7.5 LF STRL

## (undated) DEVICE — Device

## (undated) DEVICE — ERBE NESSY®PLATE 170 SPLIT; 168CM²; CABLE 3M: Brand: ERBE

## (undated) DEVICE — VIAL FORMALIN CAP 10P 40ML

## (undated) DEVICE — SPNG GZ WOVN 4X4IN 12PLY 10/BX STRL

## (undated) DEVICE — FIAPC® PROBE W/ FILTER 2200 A OD 2.3MM/6.9FR; L 2.2M/7.2FT: Brand: ERBE